# Patient Record
Sex: FEMALE | Race: WHITE | NOT HISPANIC OR LATINO | Employment: FULL TIME | URBAN - METROPOLITAN AREA
[De-identification: names, ages, dates, MRNs, and addresses within clinical notes are randomized per-mention and may not be internally consistent; named-entity substitution may affect disease eponyms.]

---

## 2020-09-16 ENCOUNTER — COMMUNICATION - HEALTHEAST (OUTPATIENT)
Dept: SCHEDULING | Facility: CLINIC | Age: 35
End: 2020-09-16

## 2020-09-16 ENCOUNTER — COMMUNICATION - HEALTHEAST (OUTPATIENT)
Dept: CARDIOLOGY | Facility: CLINIC | Age: 35
End: 2020-09-16

## 2020-09-17 ENCOUNTER — OFFICE VISIT - HEALTHEAST (OUTPATIENT)
Dept: CARDIOLOGY | Facility: CLINIC | Age: 35
End: 2020-09-17

## 2020-09-17 DIAGNOSIS — R00.1 SINUS BRADYCARDIA: ICD-10-CM

## 2020-09-17 ASSESSMENT — MIFFLIN-ST. JEOR: SCORE: 1635.7

## 2020-09-18 ENCOUNTER — HOSPITAL ENCOUNTER (OUTPATIENT)
Dept: CARDIOLOGY | Facility: CLINIC | Age: 35
Discharge: HOME | End: 2020-09-18
Attending: INTERNAL MEDICINE

## 2020-09-21 ENCOUNTER — OFFICE VISIT - HEALTHEAST (OUTPATIENT)
Dept: FAMILY MEDICINE | Facility: CLINIC | Age: 35
End: 2020-09-21

## 2020-09-21 DIAGNOSIS — R00.1 SINUS BRADYCARDIA: ICD-10-CM

## 2020-09-21 DIAGNOSIS — E27.8 ADRENAL INCIDENTALOMA (H): ICD-10-CM

## 2020-09-21 DIAGNOSIS — R10.13 EPIGASTRIC PAIN: ICD-10-CM

## 2020-09-21 DIAGNOSIS — K80.20 CALCULUS OF GALLBLADDER WITHOUT CHOLECYSTITIS WITHOUT OBSTRUCTION: ICD-10-CM

## 2020-09-23 LAB
ALDOST SERPL-MCNC: 4.6 NG/DL
DHEA-S SERPL-MCNC: 270 UG/DL (ref 45–270)

## 2020-09-30 ENCOUNTER — OFFICE VISIT - HEALTHEAST (OUTPATIENT)
Dept: SURGERY | Facility: CLINIC | Age: 35
End: 2020-09-30

## 2020-09-30 ENCOUNTER — COMMUNICATION - HEALTHEAST (OUTPATIENT)
Dept: FAMILY MEDICINE | Facility: CLINIC | Age: 35
End: 2020-09-30

## 2020-09-30 DIAGNOSIS — K80.20 CALCULUS OF GALLBLADDER WITHOUT CHOLECYSTITIS WITHOUT OBSTRUCTION: ICD-10-CM

## 2020-09-30 DIAGNOSIS — K80.50 BILIARY COLIC SYMPTOM: ICD-10-CM

## 2020-10-01 ENCOUNTER — AMBULATORY - HEALTHEAST (OUTPATIENT)
Dept: SURGERY | Facility: AMBULATORY SURGERY CENTER | Age: 35
End: 2020-10-01

## 2020-10-01 DIAGNOSIS — Z11.59 ENCOUNTER FOR SCREENING FOR OTHER VIRAL DISEASES: ICD-10-CM

## 2020-11-02 ENCOUNTER — AMBULATORY - HEALTHEAST (OUTPATIENT)
Dept: LAB | Facility: CLINIC | Age: 35
End: 2020-11-02

## 2020-11-02 DIAGNOSIS — Z11.59 ENCOUNTER FOR SCREENING FOR OTHER VIRAL DISEASES: ICD-10-CM

## 2020-11-04 ENCOUNTER — COMMUNICATION - HEALTHEAST (OUTPATIENT)
Dept: SCHEDULING | Facility: CLINIC | Age: 35
End: 2020-11-04

## 2020-11-04 ENCOUNTER — ANESTHESIA - HEALTHEAST (OUTPATIENT)
Dept: SURGERY | Facility: AMBULATORY SURGERY CENTER | Age: 35
End: 2020-11-04

## 2020-11-04 ASSESSMENT — MIFFLIN-ST. JEOR: SCORE: 1603.94

## 2020-11-05 ENCOUNTER — SURGERY - HEALTHEAST (OUTPATIENT)
Dept: SURGERY | Facility: AMBULATORY SURGERY CENTER | Age: 35
End: 2020-11-05

## 2020-11-05 ASSESSMENT — MIFFLIN-ST. JEOR: SCORE: 1603.94

## 2020-11-09 ENCOUNTER — COMMUNICATION - HEALTHEAST (OUTPATIENT)
Dept: SURGERY | Facility: CLINIC | Age: 35
End: 2020-11-09

## 2020-11-10 ENCOUNTER — COMMUNICATION - HEALTHEAST (OUTPATIENT)
Dept: SURGERY | Facility: CLINIC | Age: 35
End: 2020-11-10

## 2020-11-12 ENCOUNTER — OFFICE VISIT - HEALTHEAST (OUTPATIENT)
Dept: FAMILY MEDICINE | Facility: CLINIC | Age: 35
End: 2020-11-12

## 2020-11-12 DIAGNOSIS — T50.905A ADVERSE EFFECT OF DRUG, INITIAL ENCOUNTER: ICD-10-CM

## 2021-04-08 ENCOUNTER — RECORDS - HEALTHEAST (OUTPATIENT)
Dept: ADMINISTRATIVE | Facility: OTHER | Age: 36
End: 2021-04-08

## 2021-04-29 ENCOUNTER — AMBULATORY - HEALTHEAST (OUTPATIENT)
Dept: NURSING | Facility: CLINIC | Age: 36
End: 2021-04-29

## 2021-05-20 ENCOUNTER — AMBULATORY - HEALTHEAST (OUTPATIENT)
Dept: NURSING | Facility: CLINIC | Age: 36
End: 2021-05-20

## 2021-06-05 VITALS
DIASTOLIC BLOOD PRESSURE: 70 MMHG | WEIGHT: 208.3 LBS | SYSTOLIC BLOOD PRESSURE: 100 MMHG | BODY MASS INDEX: 33.62 KG/M2 | HEART RATE: 53 BPM | OXYGEN SATURATION: 99 %

## 2021-06-05 VITALS — WEIGHT: 200 LBS | BODY MASS INDEX: 32.14 KG/M2 | HEIGHT: 66 IN

## 2021-06-05 VITALS
HEART RATE: 60 BPM | DIASTOLIC BLOOD PRESSURE: 62 MMHG | BODY MASS INDEX: 33.73 KG/M2 | SYSTOLIC BLOOD PRESSURE: 110 MMHG | WEIGHT: 209 LBS

## 2021-06-05 VITALS
SYSTOLIC BLOOD PRESSURE: 130 MMHG | BODY MASS INDEX: 33.27 KG/M2 | DIASTOLIC BLOOD PRESSURE: 74 MMHG | HEART RATE: 40 BPM | RESPIRATION RATE: 16 BRPM | WEIGHT: 207 LBS | HEIGHT: 66 IN

## 2021-06-05 VITALS — SYSTOLIC BLOOD PRESSURE: 120 MMHG | WEIGHT: 204.9 LBS | DIASTOLIC BLOOD PRESSURE: 68 MMHG | BODY MASS INDEX: 33.07 KG/M2

## 2021-06-11 NOTE — PATIENT INSTRUCTIONS - HE
Marii,    It was a pleasure to see you today at [facility].    My nurse or I will call you with the Holter monitor results. I am optimistic that the findings will be normal.    Please call us if you have any questions or concerns about your heart.      Jj Ludwig M.D.  [facility]

## 2021-06-11 NOTE — PROGRESS NOTES
Assessment/Plan:     1. Epigastric pain  2. Calculus of gallbladder without cholecystitis without obstruction  Encourage patient to keep her appointment with surgery as scheduled.  Advised trial of omeprazole once daily for the next 14 days to see if this makes a difference in her discomfort as any of her symptoms are suggestive of gastritis or similar.  Continue to avoid fatty foods.  Monitor for worsening or onset of severe symptoms that fail to improve.    3. Adrenal incidentaloma (H)  We reviewed that this is most likely an incidental finding, however will screen for underlying condition that could potentially become problematic.  We will plan to obtain CT scan imaging in 1 year to confirm stability.  - Dehydroepiandrosterone Sulfate, Serum (DHEAS)  - Aldosterone, Serum    4. Sinus bradycardia  Appreciate evaluation by cardiology, will await Holter monitor results.      Patient Instructions   Begin omeprazole (Prilosec) once daily for the next 2 weeks.  You may take Tums, Rolaids, or Maalox as needed.  This can help with stomach irritation related to acid which can sometimes mimic gallstones.       Return in about 4 weeks (around 10/19/2020) for Annual physical with Pap.      Subjective:      Marii Mcleod is a 35 y.o. female presented to clinic today for follow-up of emergency room visit that occurred on September 16.  She had had epigastric and right upper quadrant pain that was persisting, worsening throughout the day, associated with some nausea.  In the emergency room her labs looked good.  CT imaging showed gallstones but no signs of cholecystitis.  It took a few days but the pain resolved after that visit with ibuprofen, Tylenol, rest, and a light diet.  2 days ago she awoke with some mild pain, that is now resolved entirely.  Still little bit tender to her abdomen she feels.  She has been eating lighter, avoiding fried and fatty foods.  She is scheduled to see Dr. Crawford of surgery on September  30.  Bowel movements are normal, denies hematochezia or melena.  Denies nausea.  No lightheadedness or dizziness.    During emergency room visit it was noted that her heart rate was frequently in the 30s.  She saw Dr. Ludwig of cardiology on September 17, he reviewed lab results, ordered Holter monitor which remains pending.  She denies any lightheadedness, dizziness, shortness of breath, fatigue, or limitation in activity level.    CT of the abdomen and pelvis obtained in emergency room incidentally notes 1.8 cm right adrenal nodule.  Patient is unaware of previous imaging.  She denies any difficulties with panic attacks, lightheadedness, episodes of shaking, sweating inappropriately, or other symptoms of pheochromocytoma.  Denies is any change in hair growth, significant change in weight.    We reviewed that she is due for a Pap smear, tetanus booster, and influenza vaccine.    Current Outpatient Medications   Medication Sig Dispense Refill     cholecalciferol, vitamin D3, 50 mcg (2,000 unit) Tab Take 1 tablet by mouth daily.       docosahexaenoic acid/epa (FISH OIL ORAL) Take 1,200 mg by mouth daily.       multivit-min/ferrous fumarate (MULTI VITAMIN ORAL) Take 1 tablet by mouth daily.       ondansetron (ZOFRAN-ODT) 4 MG disintegrating tablet Take 1 tablet (4 mg total) by mouth every 6 (six) hours as needed for nausea. 15 tablet 0     No current facility-administered medications for this visit.        Past Medical History, Family History, and Social History reviewed.  No past medical history on file.  Past Surgical History:   Procedure Laterality Date     TONSILLECTOMY AND ADENOIDECTOMY  2001     Patient has no known allergies.  Family History   Problem Relation Age of Onset     No Medical Problems Sister      No Medical Problems Brother      Hypothyroidism Sister      No Medical Problems Sister      Morbid Obesity Mother         had gastric bypass at 57     Diabetes type II Father      Hypothyroidism Father       Hypertension Father      Pacemaker Neg Hx      Social History     Socioeconomic History     Marital status: Single     Spouse name: Not on file     Number of children: 0     Years of education: Not on file     Highest education level: Not on file   Occupational History     Occupation: electronic banking     Comment: [email or web address]   Social Needs     Financial resource strain: Not on file     Food insecurity     Worry: Not on file     Inability: Not on file     Transportation needs     Medical: Not on file     Non-medical: Not on file   Tobacco Use     Smoking status: Never Smoker     Smokeless tobacco: Never Used   Substance and Sexual Activity     Alcohol use: Never     Drug use: Never     Sexual activity: Not on file   Lifestyle     Physical activity     Days per week: 3 days     Minutes per session: 60 min     Stress: Not on file   Relationships     Social connections     Talks on phone: Not on file     Gets together: Not on file     Attends Jainism service: Not on file     Active member of club or organization: Not on file     Attends meetings of clubs or organizations: Not on file     Relationship status: Not on file     Intimate partner violence     Fear of current or ex partner: Not on file     Emotionally abused: Not on file     Physically abused: Not on file     Forced sexual activity: Not on file   Other Topics Concern     Not on file   Social History Narrative     Not on file         Review of systems is as stated in HPI, and the remainder of the 10 system review is otherwise negative.    Objective:     Vitals:    09/21/20 1541   BP: 110/62   Patient Site: Right Arm   Patient Position: Sitting   Cuff Size: Adult Regular   Pulse: 60   Weight: 209 lb (94.8 kg)    Body mass index is 33.73 kg/m .    Alert female.  Mucous membranes moist.  Heart is with bradycardic but regular rhythm, heart rate approximately 40 to 45 bpm on my exam.  No murmurs.  Lungs clear and well aerated.  Abdomen is soft.  Very mild  tenderness palpation right upper quadrant with no palpable masses or organomegaly.  No rebound or guarding.  Extremities without edema.      This note has been dictated using voice recognition software. Any grammatical or context distortions are unintentional and inherent to the the software.

## 2021-06-11 NOTE — TELEPHONE ENCOUNTER
Wellness Screening Tool  Symptom Screening:  Do you have one of the following NEW symptoms:    Fever (subjective or >100.0)?  No    A new cough?  No    Shortness of breath?  No     Chills? No     New loss of taste or smell? No     Generalized body aches? No     New persistent headache? No     New sore throat? No     Nausea, vomiting, or diarrhea?  nausea started yesterday, comes and goes    Within the past 2 weeks, have you been exposed to someone with a known positive illness below:    COVID-19 (known or suspected)?  No    Chicken pox?  No    Mealses?  No    Pertussis?  No    Patient notified of visitor policy- They may have one person accompany them to their appointment, but they will need to wear a mask and will be screened upon arrival for symptoms: Yes  Pt informed to wear a mask: Yes  Pt notified if they develop any symptoms listed above, prior to their appointment, they are to call the clinic directly at [phone] for further instructions.  Yes  Patient's appointment status: Patient will be seen in clinic as scheduled on 9/17/20

## 2021-06-11 NOTE — PROGRESS NOTES
I was consulted by Jessica Bhagat MD to evaluate this patients gall bladder.    HPI: Marii Mcleod is a 35 y.o. female who has been experiencing some problems with epigastric abdominal pain.. she has been noting this for about 2 weeks. It has been occurring about a few times per week. It is not associated with nausea or vomiting.  She was worked up in the ER with a CT scan which showed stones in the gall bladder.    Allergies:Patient has no known allergies.    History reviewed. No pertinent past medical history.    Past Surgical History:   Procedure Laterality Date     TONSILLECTOMY AND ADENOIDECTOMY  2001       CURRENT MEDS:    Current Outpatient Medications:      cholecalciferol, vitamin D3, 50 mcg (2,000 unit) Tab, Take 1 tablet by mouth daily., Disp: , Rfl:      docosahexaenoic acid/epa (FISH OIL ORAL), Take 1,200 mg by mouth daily., Disp: , Rfl:      multivit-min/ferrous fumarate (MULTI VITAMIN ORAL), Take 1 tablet by mouth daily., Disp: , Rfl:      omeprazole (PRILOSEC) 20 MG capsule, Take 20 mg by mouth daily before breakfast., Disp: , Rfl:      ondansetron (ZOFRAN-ODT) 4 MG disintegrating tablet, Take 1 tablet (4 mg total) by mouth every 6 (six) hours as needed for nausea., Disp: 15 tablet, Rfl: 0    Family History   Problem Relation Age of Onset     No Medical Problems Sister      No Medical Problems Brother      Hypothyroidism Sister      No Medical Problems Sister      Morbid Obesity Mother         had gastric bypass at 57     Diabetes type II Father      Hypothyroidism Father      Hypertension Father      Pacemaker Neg Hx         reports that she has never smoked. She has never used smokeless tobacco. She reports that she does not drink alcohol or use drugs.    Review of Systems:  10 system were reviewed and all are within normal limits except for those listed in the HPI.      EXAM:  /68 (Patient Site: Right Arm, Patient Position: Sitting, Cuff Size: Adult Regular)   Wt 204 lb 14.4 oz  (92.9 kg)   BMI 33.07 kg/m    GENERAL: Well developed female   EYES: Anicteric Sclera,  EOMI  CARDIAC: RRR w/out murmur  CHEST/LUNG: Clear to ascultation, No wheezes  ABDOMEN: Soft with, +Bowel Sounds  NEURO:No focal deficits, ambulatory  LYMPH: No Axillary or inguinal Adenopathy  EXTREMITIES: Ambulatory, No lower extremity deformities      LABS:  Lab Results   Component Value Date    WBC 7.6 09/16/2020    HGB 14.4 09/16/2020    HCT 43.2 09/16/2020    MCV 88 09/16/2020     09/16/2020     INR/Prothrombin Time      Lab Results   Component Value Date    ALT 32 09/16/2020    AST 26 09/16/2020    ALKPHOS 48 09/16/2020    BILITOT 0.9 09/16/2020       IMAGES:   EXAM: CT ABDOMEN AND PELVIS WITH CONTRAST  LOCATION: [facility]  DATE/TIME: 09/16/2020, 4:20 AM     INDICATION: Upper abdominal pain.  COMPARISON: None.     TECHNIQUE: CT scan of the abdomen and pelvis was performed following injection of IV contrast. Multiplanar reformats were obtained. Dose reduction techniques were used.  CONTRAST: 100 mL Iohexol.     FINDINGS:     LOWER CHEST: Unremarkable.     HEPATOBILIARY: The gallbladder is moderately distended and contains several gallstones. No visualized gallbladder wall thickening or pericholecystic inflammatory changes.     SPLEEN: Unremarkable.     PANCREAS: Unremarkable.     ADRENAL GLANDS: 1.8 x 1.1 cm right adrenal nodule (series 3 image 41).     KIDNEYS/BLADDER: Unremarkable.     BOWEL: Normal appendix.     LYMPH NODES: Unremarkable.     OTHER: A trace amount of free fluid in the pelvis, within normal physiologic limits.     IMPRESSION:   1.  The gallbladder is moderately distended and contains several gallstones. No convincing CT evidence of acute cholecystitis. If there is a clinical concern for acute cholecystitis, a right upper quadrant ultrasound or HIDA scan could be considered for   further evaluation.  2.  No other cause of acute pain identified in the abdomen or pelvis. Normal  appendix.  3.  1.8 cm indeterminate right adrenal nodule. In the absence of known malignancy, this likely represents an adenoma.       Assessment/Plan: Pt with signs and symptoms consistent with chronic cholecystitis. I have recommended a cholecystectomy. I discussed with her the plan to do this laparoscopically understanding the possibility of needing to convert to an open operation. I went over some of the risks of surgery including but not limited to bleeding, infection and bile duct injury. I also discussed the outpatient nature of the surgery and the expected recovery time.      The pt also has a 1.8 cm R adrenal mass.  This lesion does not appear hormonally active on initial studies. This should be monitored periodically.      Manan Crawford MD  [phone]  [facility] Department of Surgery

## 2021-06-11 NOTE — TELEPHONE ENCOUNTER
Has follow up appt with cardiology tomorrow and Soon[facility]  PCP visit on Monday 9/21/20 and then surgerical appt .   FNA Follow up question from ED visit  : Pt called.    Presenting problem :  Currently : no change in symptoms = Seen in ED @ [facility] 9/16/20 and left at 645 am , unable to get any relief from Tylenol and RUQ constant  abdominal pain is still up to  9/10 .  Abdominal pain - Dx with  Bilary Gallstones.   Disposition and recommendations : Reviewed discharge summary instruction but didn't address this pain issue . FNA  Conferenced and spoke to [facility] ED  Dr Milligan and she agreed returning to ED for pain control  Was appropriate and Pt agrees to go .   Caller verbalizes understanding and denies further questions and will call back if further symptoms to triage or questions  . Dorcas Ferreira RN  - [facility] Nurse Advisor   COVID 19 Nurse Triage Plan/Patient Instructions    Please be aware that novel coronavirus (COVID-19) may be circulating in the community. If you develop symptoms such as fever, cough, or SOB or if you have concerns about the presence of another infection including coronavirus (COVID-19), please contact your health care provider or visit [email or web address].     Disposition/Instructions Pt agrees to go to ED.     ED Visit recommended. Follow protocol based instructions.      Bring Your Own Device:  Please also bring your smart device(s) (smart phones, tablets, laptops) and their charging cables for your personal use and to communicate with your care team during your visit.      Thank you for taking steps to prevent the spread of this virus.  o Limit your contact with others.  o Wear a simple mask to cover your cough.  o Wash your hands well and often.    Reason for Disposition    Nursing judgment or information in reference    Additional Information    Negative: Nursing judgment, per information in Reference    Negative: Information only call about a Well Adult (no illness or  injury)    Protocols used: NO GUIDELINE [id number] Main nurse advisors .

## 2021-06-11 NOTE — PATIENT INSTRUCTIONS - HE
Begin omeprazole (Prilosec) once daily for the next 2 weeks.  You may take Tums, Rolaids, or Maalox as needed.  This can help with stomach irritation related to acid which can sometimes mimic gallstones.

## 2021-06-12 NOTE — ANESTHESIA CARE TRANSFER NOTE
Last vitals:   Vitals:    11/05/20 1250   BP: (P) 118/60   Pulse: (P) 63   Resp: (P) 16   Temp: (P) 36.4  C (97.5  F)   SpO2: (P) 100%     Patient's level of consciousness is drowsy  Spontaneous respirations: yes  Maintains airway independently: yes  Dentition unchanged: yes  Oropharynx: oropharynx clear of all foreign objects    QCDR Measures:  ASA# 20 - Surgical Safety Checklist: WHO surgical safety checklist completed prior to induction    PQRS# 430 - Adult PONV Prevention: 4558F - Pt received => 2 anti-emetic agents (different classes) preop & intraop  ASA# 8 - Peds PONV Prevention: NA - Not pediatric patient, not GA or 2 or more risk factors NOT present  PQRS# 424 - Shahrzad-op Temp Management: 4559F - At least one body temp DOCUMENTED => 35.5C or 95.9F within required timeframe  PQRS# 426 - PACU Transfer Protocol: - Transfer of care checklist used  ASA# 14 - Acute Post-op Pain: ASA14B - Patient did NOT experience pain >= 7 out of 10

## 2021-06-12 NOTE — TELEPHONE ENCOUNTER
Patient's surgeon or provider: Dr. Crawford     Caller: Marii     Phone Number: [phone]  OK to leave message: Yes    Reason for Call: Pt is calling to request a return to work note [place]

## 2021-06-12 NOTE — ANESTHESIA POSTPROCEDURE EVALUATION
Patient: Marii Mcleod  Procedure(s):  CHOLECYSTECTOMY, LAPAROSCOPIC  Anesthesia type: general    Patient location: Phase II Recovery  Last vitals:   Vitals Value Taken Time   /74 11/05/20 1415   Temp 36.4  C (97.5  F) 11/05/20 1250   Pulse 45 11/05/20 1428   Resp 16 11/05/20 1345   SpO2 99 % 11/05/20 1428   Vitals shown include unvalidated device data.  Post vital signs: stable  Level of consciousness: awake and responds to simple questions  Post-anesthesia pain: pain controlled  Post-anesthesia nausea and vomiting: no  Pulmonary: unassisted, return to baseline  Cardiovascular: stable and blood pressure at baseline  Hydration: adequate  Anesthetic events: no    QCDR Measures:  ASA# 11 - Shahrzad-op Cardiac Arrest: ASA11B - Patient did NOT experience unanticipated cardiac arrest  ASA# 12 - Shahrzad-op Mortality Rate: ASA12B - Patient did NOT die  ASA# 13 - PACU Re-Intubation Rate: ASA13B - Patient did NOT require a new airway mgmt  ASA# 10 - Composite Anes Safety: ASA10A - No serious adverse event    Additional Notes: baseline bradycardia continues. BP stable.

## 2021-06-12 NOTE — ANESTHESIA PREPROCEDURE EVALUATION
Anesthesia Evaluation      Patient summary reviewed   No history of anesthetic complications     Airway   Mallampati: II  Neck ROM: full   Pulmonary - negative ROS and normal exam                          Cardiovascular - negative ROS and normal exam  ECG reviewed     ROS comment: Marked sinus bradycardia - recent holter monitor essentially normal     Neuro/Psych - negative ROS     Endo/Other - negative ROS   (+) obesity (bmi 32),      Comments: Adrenal incidentaloma - labs ok    GI/Hepatic/Renal - negative ROS           Dental - normal exam   (+) caps                       Anesthesia Plan  Planned anesthetic: general endotracheal and total IV anesthesia  Versed/fentanyl/propofol/mervin/glyco  Propofol TIVA  Ketamine PRN  Decadron/zofran/scop      ASA 2   Induction: intravenous   Anesthetic plan and risks discussed with: patient  Anesthesia plan special considerations: antiemetics,   Post-op plan: routine recovery      covid pcr negative 11/2/2020    Results for orders placed or performed during the hospital encounter of 11/05/20   POCT Pregnancy (Beta-hCG, Qual), Urine (Point of Care) on DOS   Result Value Ref Range    POC Preg, Urine Negative Negative    POCT Kit Lot Number [id number]     POCT Kit Expiration Date 202,202     Pos Control Valid Control Valid Control    Neg Control Valid Control Valid Control    Dipstick Lot Number      Dipstick Expiration Date      POC Specific Gravity, Urine         Conclusion    HOLTER MONITOR REPORT:     Ordering provider:  Cesar Ludwig MD   Indication: Bradycardia  Monitoring time: 24 Hours.      Interpretation:     1. Baseline rhythm: Sinus bradycardia and sinus rhythm, average heart rate 54 bpm, minimum heart rate 37 bpm 11:56 PM, sinus bradycardia, maximal heart rate 164 bpm at 8:53 PM, tachycardia.       2. Longest pause: No significant pauses.       3. QRS morphology: Narrow.       4. Supraventricular ectopy: Rare PACs total of 29 beats.  No atrial runs, no PSVT or atrial  flutter.     5. Ventricular ectopy:      None detected     6. Atrial fibrillation/flutter:     None detected     7. AV conduction: Normal AV josué conduction.       8. Symptomatic transmissions: Activities diary without symptoms recorded.     Conclusion:       1. Essentially normal 24-hour Holter monitor demonstrating resting sinus rhythm and sinus bradycardia and appropriate acceleration of sinus rates with normal AV josué conduction during exercise as indicated by the patient.   2. Rare PACs without atrial fibrillation or flutter.

## 2021-06-13 NOTE — PROGRESS NOTES
Assessment:     1. Adverse effect of drug, initial encounter  methylPREDNISolone (MEDROL DOSEPACK) 4 mg tablet       Plan:     1. Adverse effect of drug, initial encounter  Patient has an urticarial hive-like rash on her anterior abdominal wall rolling over into her flanks around almost touching in the midline in the back; this may well be due to the preoperative painting with possible iodine-based dye and/or the plastic over the abdomen; also a possibility of hydrocodone  - methylPREDNISolone (MEDROL DOSEPACK) 4 mg tablet; Follow package directions  Dispense: 21 tablet; Refill: 0      Subjective:   Patient had a laparoscopic cholecystectomy approximately 6 days ago and had uneventful recovery; she was discharged on hydrocodone tablets which she took Thursday Friday Saturday Sunday she broke out in a rash on her anterior abdominal wall and it went around to her back it is urticarial in nature it appears to be going over her thighs.  This appears to be a drug reaction either related to the surgical preoperative painting and prepping and/or occlusive dressing over the abdomen during gallbladder surgery.  Possibility of hydrocodone also exists.  Patient will be treated with Medrol Dosepak.  Adrenal incidentaloma noted and should be followed in 1 year I had patient repeat instructions to me.  Very pleasant young lady who is doing really quite well postoperatively.    Review of Systems: A complete 14 point review of systems was obtained and is negative or as stated in the history of present illness.    No past medical history on file.  Family History   Problem Relation Age of Onset     No Medical Problems Sister      No Medical Problems Brother      Hypothyroidism Sister      No Medical Problems Sister      Morbid Obesity Mother         had gastric bypass at 57     Diabetes type II Father      Hypothyroidism Father      Hypertension Father      Pacemaker Neg Hx      Past Surgical History:   Procedure Laterality Date      KS LAP,CHOLECYSTECTOMY N/A 11/5/2020    Procedure: CHOLECYSTECTOMY, LAPAROSCOPIC;  Surgeon: Manan Crawford MD;  Location: [facility];  Service: General     TONSILLECTOMY AND ADENOIDECTOMY  2001     Social History     Tobacco Use     Smoking status: Never Smoker     Smokeless tobacco: Never Used   Substance Use Topics     Alcohol use: Never     Drug use: Never         Objective:   /70 (Patient Site: Right Arm, Patient Position: Sitting, Cuff Size: Adult Large)   Pulse (!) 53   Wt 208 lb 4.8 oz (94.5 kg)   LMP 10/25/2020 (Approximate)   SpO2 99%   BMI 33.62 kg/m      General Appearance:  Normal  Head:  Normal  Ears: Normal  Eyes:  Normal  Nose:  Normal  Throat:  Normal  Neck:  Normal  Back:  Normal  Chest/Breast:Normal  Lungs:  Normal  Heart:  Normal  Abdomen:  Normal  Musculoskeletal:  Normal  Lymphatic:  Normal  Skin/Hair/Nails:  Normal  Neurologic:  Normal  Extremities:  Normal  Genitourinary: Normal  Pulses:  Normal           This note has been dictated using voice recognition software. Any grammatical or context distortions are unintentional and inherent to the the software.

## 2021-06-16 PROBLEM — K80.50 BILIARY COLIC SYMPTOM: Status: ACTIVE | Noted: 2020-10-01

## 2021-06-16 PROBLEM — E27.8 ADRENAL INCIDENTALOMA (H): Status: ACTIVE | Noted: 2020-09-21

## 2021-06-16 PROBLEM — K80.20 CALCULUS OF GALLBLADDER WITHOUT CHOLECYSTITIS WITHOUT OBSTRUCTION: Chronic | Status: ACTIVE | Noted: 2020-09-16

## 2021-06-16 PROBLEM — R00.1 SINUS BRADYCARDIA: Status: ACTIVE | Noted: 2020-09-21

## 2021-06-20 NOTE — LETTER
Letter by Jessica Bhagat MD at      Author: Jessica Bhagat MD Service: -- Author Type: --    Filed:  Encounter Date: 9/30/2020 Status: (Other)         Marii Mcleod  [address] [place] [address]             September 30, 2020        Dear Ms. Mcleod,    Below are the results from your recent visit:    Resulted Orders   Dehydroepiandrosterone Sulfate, Serum (DHEAS)   Result Value Ref Range    DHEAS 270 45 - 270 ug/dL      Comment:      REFERENCE INTERVAL: DHEAS    Access complete set of age- and/or gender-specific reference   intervals for this test in the [employer] [facility] Directory   ([email or web address]).  Performed By: [facility]  [address] [place] [id number]  : Sandra Samano MD   Aldosterone, Serum   Result Value Ref Range    Aldosterone 4.6 ng/dL      Comment:      INTERPRETIVE INFORMATION: Aldosterone, Serum    Reference intervals for age 15 and older:   Upright .........  4.0 - 31.0 ng/dL   Supine ..........  Less than or equal to 16.0 ng/dL   Unspecified .....  Less than or equal to 31.0 ng/dL    Normal serum levels of aldosterone are dependent on the sodium   intake and whether the patient is upright or supine. High sodium   intake will tend to suppress serum aldosterone, whereas low sodium   intake will elevate serum aldosterone. The reference intervals for   serum aldosterone are based on normal sodium intake.      Access complete set of age- and/or gender-specific reference   intervals for this test in the [facility] Directory   ([email or web address]).  Performed By: [facility]  [address] [place] [id number]  : Sandra Samano MD       Your test results are in the normal range.  I think it would be a good idea to repeat these in about a year, especially since the DHEAS hormone is right at the upper limit of normal.  No signs that the spot seen on your adrenal gland is causing any problems.      Please call  with questions or contact us using [employer].    Sincerely,        Electronically signed by Jessica Bhagat MD

## 2021-06-21 NOTE — LETTER
Letter by Diann Benavidez RN at      Author: Diann Benavidez RN Service: -- Author Type: --    Filed:  Encounter Date: 11/10/2020 Status: (Other)         November 10, 2020     Patient: Marii Mcleod   YOB: 1985   Date of Visit: 11/10/2020       To Whom It May Concern:    It is my medical opinion that Marii Mcleod may return to work on 11/13/2020 with no restrictions.    If you have any questions or concerns, please don't hesitate to call.    Sincerely,        Electronically signed by Manan Crawford MD

## 2021-06-27 ENCOUNTER — HEALTH MAINTENANCE LETTER (OUTPATIENT)
Age: 36
End: 2021-06-27

## 2021-06-29 NOTE — PROGRESS NOTES
Progress Notes by Cesar Ludwig MD at 9/17/2020  8:20 AM     Author: Cesar Ludwig MD Service: -- Author Type: Physician    Filed: 9/17/2020  9:03 AM Encounter Date: 9/17/2020 Status: Signed    : Cesar Ludwig MD (Physician)            [facility] Consultation Note    Thank you, Dr. Omar Norman, for advising Marii Mcleod to meet with me in consultation today at the [facility] to evaluate marked sinus bradycardia.     Assessment:    1. Sinus bradycardia  Holter Monitor       Plan:    1. We discussed means of demonstrating that she has a normal heart rate response to activities of daily living and she agreed to undergo 24-hour Holter monitoring to establish this.  I counseled Marii that I am optimistic that the findings will be unremarkable.    An After Visit Summary was printed and given to the patient.    Current History:    I met with Marii in consultation this morning as requested by the emergency room team.  Her sister, Era, accompanied her to the visit.    Marii went to the emergency room because of right upper quadrant discomfort was found to have gallstones by CT scanning.  She states her discomfort is improving.    While in the emergency room, an EKG demonstrated sinus bradycardia with a heart rate of 35 bpm.    Marii has virtually no cardiac symptoms.  She states she goes to the gym 3-4 times a week to participate in 1 hour exercise classes including cardio workouts.  She does not experience chest discomfort, unusual shortness of breath, orthopnea, palpitations, lightheadedness, syncope or edema.    Family history is negative for premature myocardial infarction, stroke, or the need for cardiac pacing.    Patient Active Problem List   Diagnosis   ? Class 1 obesity in adult   ? Calculus of gallbladder without cholecystitis without obstruction       Past Medical History:  History reviewed. No pertinent  "past medical history.   Her past history is negative for diabetes mellitus, hypertension or dyslipidemia.    Past Surgical History:  Past Surgical History:   Procedure Laterality Date   ? TONSILLECTOMY AND ADENOIDECTOMY  2001       Family History:  Family History   Problem Relation Age of Onset   ? No Medical Problems Sister    ? No Medical Problems Brother    ? Hypothyroidism Sister    ? No Medical Problems Sister    ? Morbid Obesity Mother         had gastric bypass at 57   ? Diabetes type II Father    ? Hypothyroidism Father    ? Hypertension Father    ? Pacemaker Neg Hx        Social History:   reports that she has never smoked. She has never used smokeless tobacco. She reports that she does not drink alcohol or use drugs.    Medications:  Outpatient Encounter Medications as of 9/17/2020   Medication Sig Dispense Refill   ? cholecalciferol, vitamin D3, 50 mcg (2,000 unit) Tab Take 1 tablet by mouth daily.     ? docosahexaenoic acid/epa (FISH OIL ORAL) Take 1,200 mg by mouth daily.     ? multivit-min/ferrous fumarate (MULTI VITAMIN ORAL) Take 1 tablet by mouth daily.     ? ondansetron (ZOFRAN-ODT) 4 MG disintegrating tablet Take 1 tablet (4 mg total) by mouth every 6 (six) hours as needed for nausea. 15 tablet 0     No facility-administered encounter medications on file as of 9/17/2020.        Allergies:  Patient has no known allergies.    Review of Systems:     General: WNL  Eyes: WNL  Ears/Nose/Throat: WNL  Lungs: WNL  Heart: WNL  Stomach: Nausea  Bladder: WNL  Muscle/Joints: WNL  Skin: WNL  Nervous System: WNL  Mental Health: Anxiety     Blood: WNL    Objective:    Wt Readings from Last 5 Encounters:   09/17/20 207 lb (93.9 kg)   09/16/20 210 lb (95.3 kg)      5' 6\" (1.676 m)  Body mass index is 33.41 kg/m .  /74 (Patient Site: Right Arm, Patient Position: Sitting, Cuff Size: Adult Regular)   Pulse (!) 40   Resp 16   Ht 5' 6\" (1.676 m)   Wt 207 lb (93.9 kg)   BMI 33.41 kg/m       Physical " Exam:    General Appearance: Alert and not in distress   HEENT: No scleral icterus; the tongue could not be examined as she is wearing a mask due to Covid restrictions, the mucous membranes are pink and moist   Neck: No cervical bruits, adenopathy, or thyromegaly; jugular venous pressure is difficult to evaluate due to obesity   Chest: The spine is straight and the chest is symmetric   Lungs: Respirations are unlabored; the lungs are clear to auscultation   Cardiovasular: Auscultation reveals normal first and second heart sounds and no murmurs, rubs, or gallops; the carotid, radial, femoral, and posterior tibial pulses are intact   Abdomen: No organomegaly, masses, bruits, or tenderness; bowel sounds are present   Extremities: No cyanosis, clubbing or edema   Skin: No xanthelasma   Neurologic: Mood and affect are appropriate       Cardiac testing:    EKG: Marked sinus bradycardia with a heart rate of 35 bpm and incomplete right bundle branch block per my personal review.    Holter monitor: ordered    Imaging:    Ct Abdomen Pelvis Without Oral With Iv Contrast    Result Date: 9/16/2020  EXAM: CT ABDOMEN AND PELVIS WITH CONTRAST LOCATION: [facility] DATE/TIME: 09/16/2020, 4:20 AM INDICATION: Upper abdominal pain. COMPARISON: None. TECHNIQUE: CT scan of the abdomen and pelvis was performed following injection of IV contrast. Multiplanar reformats were obtained. Dose reduction techniques were used. CONTRAST: 100 mL Iohexol. FINDINGS: LOWER CHEST: Unremarkable. HEPATOBILIARY: The gallbladder is moderately distended and contains several gallstones. No visualized gallbladder wall thickening or pericholecystic inflammatory changes. SPLEEN: Unremarkable. PANCREAS: Unremarkable. ADRENAL GLANDS: 1.8 x 1.1 cm right adrenal nodule (series 3 image 41). KIDNEYS/BLADDER: Unremarkable. BOWEL: Normal appendix. LYMPH NODES: Unremarkable. OTHER: A trace amount of free fluid in the pelvis, within normal physiologic limits.      1.  The gallbladder is moderately distended and contains several gallstones. No convincing CT evidence of acute cholecystitis. If there is a clinical concern for acute cholecystitis, a right upper quadrant ultrasound or HIDA scan could be considered for further evaluation. 2.  No other cause of acute pain identified in the abdomen or pelvis. Normal appendix. 3.  1.8 cm indeterminate right adrenal nodule. In the absence of known malignancy, this likely represents an adenoma.       Lab Review:    Lab Results   Component Value Date     09/16/2020    K 4.4 09/16/2020     09/16/2020    CO2 25 09/16/2020    BUN 15 09/16/2020    CREATININE 0.78 09/16/2020    CALCIUM 9.4 09/16/2020     Lab Results   Component Value Date    WBC 7.6 09/16/2020    HGB 14.4 09/16/2020    HCT 43.2 09/16/2020    MCV 88 09/16/2020     09/16/2020     Lab Results   Component Value Date    CHOL 160 10/29/2014    TRIG 40 10/29/2014    HDL 54 10/29/2014     LDL Calculated (mg/dL)   Date Value   10/29/2014 98     No results found for: BNP        Much or all of the text in this note was generated through the use of the Dragon Dictate voice-to-text software. Errors in spelling or words which seem out of context are unintentional. Sound alike errors, in particular, may have escaped editing.

## 2021-10-17 ENCOUNTER — HEALTH MAINTENANCE LETTER (OUTPATIENT)
Age: 36
End: 2021-10-17

## 2021-11-03 ENCOUNTER — TELEPHONE (OUTPATIENT)
Dept: NURSING | Facility: CLINIC | Age: 36
End: 2021-11-03

## 2021-11-03 ENCOUNTER — E-VISIT (OUTPATIENT)
Dept: URGENT CARE | Facility: URGENT CARE | Age: 36
End: 2021-11-03
Payer: COMMERCIAL

## 2021-11-03 DIAGNOSIS — Z20.822 CLOSE EXPOSURE TO 2019 NOVEL CORONAVIRUS: Primary | ICD-10-CM

## 2021-11-03 PROCEDURE — 99421 OL DIG E/M SVC 5-10 MIN: CPT | Performed by: PREVENTIVE MEDICINE

## 2021-11-03 NOTE — PATIENT INSTRUCTIONS
"  Dear Marii Mcleod,    Based on your exposure to COVID-19 (coronavirus), we would like to test you for this virus. I have placed an order for this test.The best time for testing is 5-7 days after the exposure.    How to schedule:  Go to your [facility] home page and scroll down to the section that says  You have an appointment that needs to be scheduled  and click the large green button that says  Schedule Now  and follow the steps to find the next available opening.     If you are unable to complete these [facility] scheduling steps, please call [phone] to schedule your testing.     Return to work/school/ guidance:   For people with high risk exposures outside the home    Please let your workplace manager and staffing office know when your quarantine ends.     We can not give you an exact date as it depends on the information below. You can calculate this on your own or work with your manager/staffing office to calculate this. (For example if you were exposed on 10/4, you would have to quarantine for 14 full days. That would be through 10/18. You could return on 10/19.)    Quarantine Guidelines:  Patients (\"contacts\") who have been in close prolonged contact of an infected person(s) (within six feet for at least 15 minutes within a 24 hour period), and remain asymptomatic should enter quarantine based on the following options:    14-day quarantine period (this remains the CDC recommendation for the greatest protection against spread of COVID-19) OR    Minimum 7-day quarantine with negative RT-PCR test collected on day 5 or later OR    10-day quarantine with no test  Quarantine Guideline exceptions are as follows:    People who have been fully vaccinated do not need to quarantine if the exposure was at least 2 weeks after the last vaccination. This includes vaccinated health care workers.    Not fully vaccinated and unvaccinated Individuals who work in health care, congregate care, or congregate living " should be off work for 14 days from their last date of exposure. Community activities for this group can be resumed based on options above. Fully vaccinated individuals in this group do not need to quarantine from work after exposure.    Not fully vaccinated and unvaccinated people whose high-risk exposure was a household member should always quarantine for 14 days from their last date of exposure. Fully vaccinated people in this category do not need to quarantine.    Not fully vaccinated or unvaccinated residents of congregate care and congregate living settings should always quarantine for 14 days from their last date of exposure. Fully vaccinated residents do not need to quarantine.  Note: If you have ongoing exposure to the covid positive person, this quarantine period may be more than 14 days. (For example, if you are continued to be exposed to your child who tested positive and cannot isolate from them, then the quarantine of 7-14 days can't start until your child is no longer contagious. This is typically 10 days from onset of the child's symptoms. So the total duration may be 17-24 days in this case.)    You should continue symptom monitoring until day 14 post-exposure. If you develop signs or symptoms of COVID-19, isolate and get tested (even if you have been tested already).    How to quarantine:   Stay home and away from others. Don't go to school or anywhere else. Generally quarantine means staying home from work but there are some exceptions to this. Please contact your workplace.  No hugging, kissing or shaking hands.  Don't let anyone visit.  Cover your mouth and nose with a mask, tissue or washcloth to avoid spreading germs.  Wash your hands and face often. Use soap and water.    What are the symptoms of COVID-19?  The most common symptoms are cough, fever and trouble breathing. Less common symptoms include headache, body aches, fatigue (feeling very tired), chills, sore throat, stuffy or runny nose,  diarrhea (loose poop), loss of taste or smell, belly pain, and nausea or vomiting (feeling sick to your stomach or throwing up).  After 14 days, if you have still don't have symptoms, you likely don't have this virus.  If you develop symptoms, follow these guidelines.  If you're normally healthy: Please start another eVisit.  If you have a serious health problem (like cancer, heart failure, an organ transplant or kidney disease): Call your specialty clinic. Let them know that you might have COVID-19.    Where can I get more information?  [facility] - About COVID-19: [email or web address]  CDC - What to Do If You're Sick: www.cdc.gov/coronavirus/2019-ncov/about/steps-when-sick.html  CDC - Ending Home Isolation: www.cdc.gov/coronavirus/2019-ncov/hcp/disposition-in-home-patients.html  CDC - Caring for Someone: www.cdc.gov/coronavirus/2019-ncov/if-you-are-sick/care-for-someone.html  [facility] clinical trials (COVID-19 research studies): clinicalaffairs.[facility]-clinical-trials  Below are the COVID-19 hotlines at the [place] Department of Health ([facility]). Interpreters are available.  For health questions: Call 248-427-8211 or 1-933.103.8157 (7 a.m. to 7 p.m.)  For questions about schools and childcare: Call 604-514-8004 or 1-291.334.8633 (7 a.m. to 7 p.m.)

## 2021-11-03 NOTE — TELEPHONE ENCOUNTER
Patient calling to schedule a covid test. Patient is going to be traveling and needs a covid test. Informed that she needs to have a visit with a provider so that they can order the test. Informed that an e-visit via [employer] is the easiest option. Informed that the test can take 72 hours to result so plan on that and give an extra day to make sure.   Patient verbalized understanding.     COVID-19 testing at [facility] is by appointment only. You'll need to schedule a time to get tested. If you have symptoms (signs) of COVID, please log in to [facility] to complete an e-visit (virtual visit). This is the first step to getting tested.    If you don't have COVID symptoms and want to get tested, you should also log in to [facility] for an e-visit. This includes people who:    have had close contact with a COVID-positive person    want to be tested before or after travel    have taken part in high-risk activities    have a school testing mandate, or     were told to get tested by their care team or the health department.     A [facility] e-visit is the fastest way for you to be seen by our care team. Please choose  Next available provider  to complete an e-visit. When you choose this option, the average response time is less than one hour.  After the e-visit, you'll be able to self-schedule your test at one of our testing locations. To learn more about our testing locations or for other details, please visit our COVID-19 Resource Hub.    Wingu is also the fastest way to get your test results. You'll get your results in [facility]ingu within 3 days. If you don't use Wingu, you'll get your results in the mail in 7 to 10 days. If your test is positive and you don't view your result in [facility] within 1 business day, you'll get a phone call with your result. A positive result means that you have COVID-19.    If you have an upcoming procedure at [facility], you'll need to be tested for COVID. The test needs to happen 2 to 4  days before your procedure. If you have an upcoming procedure, we will contact you to schedule a COVID test.    If you don't have a [employer] account, please call [phone] to set up a virtual visit. You can also find community testing sites in [place] at [place]/get-tested/testing-locations. If you live in [place], please visit [email or web address]/covid-19/community-testing.htm.    Neelam Triplett RN   11/03/21 6:26 PM  [facility] Nurse Advisor

## 2022-01-06 ENCOUNTER — IMMUNIZATION (OUTPATIENT)
Dept: NURSING | Facility: CLINIC | Age: 37
End: 2022-01-06
Payer: COMMERCIAL

## 2022-01-06 PROCEDURE — 91305 COVID-19,PF,PFIZER (12+ YRS): CPT

## 2022-01-06 PROCEDURE — 0054A COVID-19,PF,PFIZER (12+ YRS): CPT

## 2022-07-24 ENCOUNTER — HEALTH MAINTENANCE LETTER (OUTPATIENT)
Age: 37
End: 2022-07-24

## 2022-10-02 ENCOUNTER — HEALTH MAINTENANCE LETTER (OUTPATIENT)
Age: 37
End: 2022-10-02

## 2023-08-12 ENCOUNTER — HEALTH MAINTENANCE LETTER (OUTPATIENT)
Age: 38
End: 2023-08-12

## 2024-03-09 ENCOUNTER — HEALTH MAINTENANCE LETTER (OUTPATIENT)
Age: 39
End: 2024-03-09

## 2024-08-10 PROBLEM — K80.50 BILIARY COLIC SYMPTOM: Status: RESOLVED | Noted: 2020-10-01 | Resolved: 2024-08-10

## 2024-08-10 PROBLEM — K80.20 CALCULUS OF GALLBLADDER WITHOUT CHOLECYSTITIS WITHOUT OBSTRUCTION: Chronic | Status: RESOLVED | Noted: 2020-09-16 | Resolved: 2024-08-10

## 2024-08-12 SDOH — HEALTH STABILITY: PHYSICAL HEALTH: ON AVERAGE, HOW MANY MINUTES DO YOU ENGAGE IN EXERCISE AT THIS LEVEL?: 50 MIN

## 2024-08-12 SDOH — HEALTH STABILITY: PHYSICAL HEALTH: ON AVERAGE, HOW MANY DAYS PER WEEK DO YOU ENGAGE IN MODERATE TO STRENUOUS EXERCISE (LIKE A BRISK WALK)?: 5 DAYS

## 2024-08-12 ASSESSMENT — SOCIAL DETERMINANTS OF HEALTH (SDOH): HOW OFTEN DO YOU GET TOGETHER WITH FRIENDS OR RELATIVES?: ONCE A WEEK

## 2024-08-13 ENCOUNTER — OFFICE VISIT (OUTPATIENT)
Dept: FAMILY MEDICINE | Facility: CLINIC | Age: 39
End: 2024-08-13
Payer: COMMERCIAL

## 2024-08-13 VITALS
HEART RATE: 50 BPM | DIASTOLIC BLOOD PRESSURE: 70 MMHG | TEMPERATURE: 98.4 F | HEIGHT: 66 IN | SYSTOLIC BLOOD PRESSURE: 110 MMHG | OXYGEN SATURATION: 99 % | BODY MASS INDEX: 34.17 KG/M2 | WEIGHT: 212.6 LBS | RESPIRATION RATE: 18 BRPM

## 2024-08-13 DIAGNOSIS — Z12.31 ENCOUNTER FOR SCREENING MAMMOGRAM FOR MALIGNANT NEOPLASM OF BREAST: ICD-10-CM

## 2024-08-13 DIAGNOSIS — Z83.49 FAMILY HISTORY OF THYROID DISEASE: ICD-10-CM

## 2024-08-13 DIAGNOSIS — Z83.3 FAMILY HISTORY OF DIABETES MELLITUS: ICD-10-CM

## 2024-08-13 DIAGNOSIS — R00.1 SINUS BRADYCARDIA: ICD-10-CM

## 2024-08-13 DIAGNOSIS — Z00.00 ROUTINE PHYSICAL EXAMINATION: Primary | ICD-10-CM

## 2024-08-13 DIAGNOSIS — Z12.4 SCREENING FOR CERVICAL CANCER: ICD-10-CM

## 2024-08-13 DIAGNOSIS — E27.8 ADRENAL INCIDENTALOMA (H): ICD-10-CM

## 2024-08-13 LAB
CHOLEST SERPL-MCNC: 178 MG/DL
CORTIS SERPL-MCNC: 9.3 UG/DL
FASTING STATUS PATIENT QL REPORTED: YES
FASTING STATUS PATIENT QL REPORTED: YES
GLUCOSE SERPL-MCNC: 91 MG/DL (ref 70–99)
HBA1C MFR BLD: 5 % (ref 0–5.6)
HDLC SERPL-MCNC: 59 MG/DL
LDLC SERPL CALC-MCNC: 111 MG/DL
NONHDLC SERPL-MCNC: 119 MG/DL
TRIGL SERPL-MCNC: 40 MG/DL
TSH SERPL DL<=0.005 MIU/L-ACNC: 3.17 UIU/ML (ref 0.3–4.2)

## 2024-08-13 PROCEDURE — 87624 HPV HI-RISK TYP POOLED RSLT: CPT | Performed by: FAMILY MEDICINE

## 2024-08-13 PROCEDURE — 83036 HEMOGLOBIN GLYCOSYLATED A1C: CPT | Performed by: FAMILY MEDICINE

## 2024-08-13 PROCEDURE — 99386 PREV VISIT NEW AGE 40-64: CPT | Performed by: FAMILY MEDICINE

## 2024-08-13 PROCEDURE — 36415 COLL VENOUS BLD VENIPUNCTURE: CPT | Performed by: FAMILY MEDICINE

## 2024-08-13 PROCEDURE — 82947 ASSAY GLUCOSE BLOOD QUANT: CPT | Performed by: FAMILY MEDICINE

## 2024-08-13 PROCEDURE — 80061 LIPID PANEL: CPT | Performed by: FAMILY MEDICINE

## 2024-08-13 PROCEDURE — G0145 SCR C/V CYTO,THINLAYER,RESCR: HCPCS | Performed by: FAMILY MEDICINE

## 2024-08-13 PROCEDURE — 82533 TOTAL CORTISOL: CPT | Performed by: FAMILY MEDICINE

## 2024-08-13 PROCEDURE — 84443 ASSAY THYROID STIM HORMONE: CPT | Performed by: FAMILY MEDICINE

## 2024-08-13 PROCEDURE — 82627 DEHYDROEPIANDROSTERONE: CPT | Performed by: FAMILY MEDICINE

## 2024-08-13 RX ORDER — EVENING PRIMROSE OIL 500 MG
CAPSULE ORAL
COMMUNITY

## 2024-08-13 RX ORDER — SODIUM PHOSPHATE,MONO-DIBASIC 19G-7G/118
ENEMA (ML) RECTAL
COMMUNITY
Start: 2007-04-01

## 2024-08-13 ASSESSMENT — PAIN SCALES - GENERAL: PAINLEVEL: NO PAIN (0)

## 2024-08-13 NOTE — PATIENT INSTRUCTIONS
Patient Education   Preventive Care Advice   This is general advice given by our system to help you stay healthy. However, your care team may have specific advice just for you. Please talk to your care team about your preventive care needs.  Nutrition  Eat 5 or more servings of fruits and vegetables each day.  Try wheat bread, brown rice and whole grain pasta (instead of white bread, rice, and pasta).  Get enough calcium and vitamin D. Check the label on foods and aim for 100% of the RDA (recommended daily allowance).  Lifestyle  Exercise at least 150 minutes each week  (30 minutes a day, 5 days a week).  Do muscle strengthening activities 2 days a week. These help control your weight and prevent disease.  No smoking.  Wear sunscreen to prevent skin cancer.  Have a dental exam and cleaning every 6 months.  Yearly exams  See your health care team every year to talk about:  Any changes in your health.  Any medicines your care team has prescribed.  Preventive care, family planning, and ways to prevent chronic diseases.  Shots (vaccines)   HPV shots (up to age 26), if you've never had them before.  Hepatitis B shots (up to age 59), if you've never had them before.  COVID-19 shot: Get this shot when it's due.  Flu shot: Get a flu shot every year.  Tetanus shot: Get a tetanus shot every 10 years.  Pneumococcal, hepatitis A, and RSV shots: Ask your care team if you need these based on your risk.  Shingles shot (for age 50 and up)  General health tests  Diabetes screening:  Starting at age 35, Get screened for diabetes at least every 3 years.  If you are younger than age 35, ask your care team if you should be screened for diabetes.  Cholesterol test: At age 39, start having a cholesterol test every 5 years, or more often if advised.  Bone density scan (DEXA): At age 50, ask your care team if you should have this scan for osteoporosis (brittle bones).  Hepatitis C: Get tested at least once in your life.  STIs (sexually  transmitted infections)  Before age 24: Ask your care team if you should be screened for STIs.  After age 24: Get screened for STIs if you're at risk. You are at risk for STIs (including HIV) if:  You are sexually active with more than one person.  You don't use condoms every time.  You or a partner was diagnosed with a sexually transmitted infection.  If you are at risk for HIV, ask about PrEP medicine to prevent HIV.  Get tested for HIV at least once in your life, whether you are at risk for HIV or not.  Cancer screening tests  Cervical cancer screening: If you have a cervix, begin getting regular cervical cancer screening tests starting at age 21.  Breast cancer scan (mammogram): If you've ever had breasts, begin having regular mammograms starting at age 40. This is a scan to check for breast cancer.  Colon cancer screening: It is important to start screening for colon cancer at age 45.  Have a colonoscopy test every 10 years (or more often if you're at risk) Or, ask your provider about stool tests like a FIT test every year or Cologuard test every 3 years.  To learn more about your testing options, visit:   .  For help making a decision, visit:   https://bit.ly/ht37117.  Prostate cancer screening test: If you have a prostate, ask your care team if a prostate cancer screening test (PSA) at age 55 is right for you.  Lung cancer screening: If you are a current or former smoker ages 50 to 80, ask your care team if ongoing lung cancer screenings are right for you.  For informational purposes only. Not to replace the advice of your health care provider. Copyright   2023 [employer]. All rights reserved. Clinically reviewed by the [facility] Transitions Program. Breaker [id number] - REV 01/24.

## 2024-08-13 NOTE — PROGRESS NOTES
"Preventive Care Visit  [facility]  Jessica Bhagat MD, Family Medicine  Aug 13, 2024      Assessment & Plan     Routine physical examination  Encouraged healthy lifestyle habits including regular exercise, healthy eating habits, and adequate calcium and vitamin D intake.  Will screen for dyslipidemia and diabetes today.  Will await screening Pap smear.  Order placed for screening mammogram.  Immunizations reviewed and up-to-date.  She declines need for contraception or STI screening.  Information provided to her regarding urgency contraception.  - Lipid panel reflex to direct LDL Fasting; Future  - Glucose; Future    Sinus bradycardia  Chronic, asymptomatic, likely related to healthy lifestyle, no intervention necessary.    Adrenal incidentaloma (H24)  She has had a little bit of weight gain, a little bit of hair growth though no overt hirsutism on exam today.  Will check DHEA and cortisol levels as a result.  Will obtain follow-up CT scan.  Previous testing unremarkable.  - Hemoglobin A1c; Future  - DHEA sulfate; Future  - Cortisol; Future  - CT Abdomen Pelvis w/o & w Contrast; Future    Family history of diabetes mellitus  Screen for diabetes today.  - Glucose; Future  - Hemoglobin A1c; Future    Family history of thyroid disease  Screen for thyroid disease.  - TSH with free T4 reflex; Future    Screening for cervical cancer  Will await screening Pap smear.  - Gynecologic Cytology (Pap) and HPV - Recommended Age 30-65 Years    Encounter for screening mammogram for malignant neoplasm of breast  Placed for screening mammogram.  - MA Screen Bilateral w/Tarik; Future            BMI  Estimated body mass index is 34.31 kg/m  as calculated from the following:    Height as of this encounter: 1.676 m (5' 6\").    Weight as of this encounter: 96.4 kg (212 lb 9.6 oz).       Counseling  Appropriate preventive services were addressed with this patient via screening, questionnaire, or discussion as " appropriate for fall prevention, nutrition, physical activity, Tobacco-use cessation, weight loss and cognition.  Checklist reviewing preventive services available has been given to the patient.  Reviewed patient's diet, addressing concerns and/or questions.           Fernanda Colvin is a 39 year old, presenting for the following:  Physical (Fasting, pap)         Health Care Directive  Patient does not have a Health Care Directive or Living Will: Discussed advance care planning with patient; information given to patient to review.    Seen in clinic today for routine preventive care visit.  She herself has no concerns.  We note that in 2020 she had a CT scan that incidentally found a right adrenal adenoma, 1.8 mm and indeterminate in characteristics.  At that time her labs were unremarkable for functionality.  She notes her menses tend to be very regular although she missed her menses last month.  Has had a little bit of terminal hair growth on her chin and nipple area, no other concerning findings or changes in's.  She is chronic bradycardia which she attributes to healthy lifestyle habits that she does cardio most days of the week, asymptomatic from this.  Family history reviewed, notable for diabetes and thyroid disease.  She is not currently sexually active, declines need for pregnancy prevention or STI screening.            8/12/2024   General Health   How would you rate your overall physical health? Good   Feel stress (tense, anxious, or unable to sleep) To some extent      (!) STRESS CONCERN      8/12/2024   Nutrition   Three or more servings of calcium each day? Yes   Diet: Regular (no restrictions)    Low fat/cholesterol    Vegetarian/vegan   How many servings of fruit and vegetables per day? 4 or more   How many sweetened beverages each day? 0-1       Multiple values from one day are sorted in reverse-chronological order         8/12/2024   Exercise   Days per week of moderate/strenous exercise 5 days    Average minutes spent exercising at this level 50 min            8/12/2024   Social Factors   Frequency of gathering with friends or relatives Once a week   Worry food won't last until get money to buy more No   Food not last or not have enough money for food? No   Do you have housing? (Housing is defined as stable permanent housing and does not include staying ouside in a car, in a tent, in an abandoned building, in an overnight shelter, or couch-surfing.) Yes   Are you worried about losing your housing? No   Lack of transportation? No   Unable to get utilities (heat,electricity)? No            8/12/2024   Dental   Dentist two times every year? Yes            8/12/2024   TB Screening   Were you born outside of the [place]? No            Today's PHQ-2 Score:       8/12/2024     1:46 PM   PHQ-2 ( 1999 [employer])   Q1: Little interest or pleasure in doing things 1   Q2: Feeling down, depressed or hopeless 1   PHQ-2 Score 2   Q1: Little interest or pleasure in doing things Several days   Q2: Feeling down, depressed or hopeless Several days   PHQ-2 Score 2           8/12/2024   Substance Use   Alcohol more than 3/day or more than 7/wk No   Do you use any other substances recreationally? No        Social History     Tobacco Use    Smoking status: Never    Smokeless tobacco: Never   Vaping Use    Vaping status: Never Used   Substance Use Topics    Alcohol use: Never    Drug use: Never          Mammogram Screening - Patient under 40 years of age: Routine Mammogram Screening not recommended.           8/12/2024   One time HIV Screening   Previous HIV test? No          8/12/2024   STI Screening   New sexual partner(s) since last STI/HIV test? No        History of abnormal Pap smear: No - age 30-64 HPV with reflex Pap every 5 years recommended        10/29/2014     9:13 AM   PAP / HPV   PAP Negative for squamous intraepithelial lesion or malignancy  Electronically signed by Dilcia Stuart CT (ASCP) on 11/7/2014 at 12:07 PM         ASCVD Risk   The ASCVD Risk score (Rickey REYES, et al., 2019) failed to calculate for the following reasons:    Cannot find a previous HDL lab    Cannot find a previous total cholesterol lab        8/12/2024   Contraception/Family Planning   Questions about contraception or family planning No           Reviewed and updated as needed this visit by Provider                    No past medical history on file.  Past Surgical History:   Procedure Laterality Date    TONSILLECTOMY & ADENOIDECTOMY  2001    [facility] LAP,CHOLECYSTECTOMY/EXPLORE N/A 11/5/2020    Procedure: CHOLECYSTECTOMY, LAPAROSCOPIC;  Surgeon: Manan Crawford MD;  Location: [facility];  Service: General     OB History   No obstetric history on file.     Lab work is in process  Labs reviewed in EPIC  BP Readings from Last 3 Encounters:   08/13/24 110/70   11/12/20 100/70   09/30/20 120/68    Wt Readings from Last 3 Encounters:   08/13/24 96.4 kg (212 lb 9.6 oz)   11/12/20 94.5 kg (208 lb 4.8 oz)   11/05/20 90.7 kg (200 lb)                  Patient Active Problem List   Diagnosis    Class 1 obesity in adult    Sinus bradycardia    Adrenal incidentaloma (H24)     Past Surgical History:   Procedure Laterality Date    TONSILLECTOMY & ADENOIDECTOMY  2001    [facility] LAP,CHOLECYSTECTOMY/EXPLORE N/A 11/5/2020    Procedure: CHOLECYSTECTOMY, LAPAROSCOPIC;  Surgeon: Manan Crawford MD;  Location: [facility];  Service: General       Social History     Tobacco Use    Smoking status: Never    Smokeless tobacco: Never   Substance Use Topics    Alcohol use: Never     Family History   Problem Relation Age of Onset    No Known Problems Sister     No Known Problems Brother     Hypothyroidism Sister     No Known Problems Sister     Morbid Obesity Mother         had gastric bypass at 57    Diabetes Type 2  Father     Hypothyroidism Father     Hypertension Father     Pacemaker No family hx of          Current Outpatient Medications   Medication Sig Dispense  "Refill    cholecalciferol, vitamin D3, 50 mcg (2,000 unit) Tab [CHOLECALCIFEROL, VITAMIN D3, 50 MCG (2,000 UNIT) TAB] Take 1 tablet by mouth daily.      docosahexaenoic acid/epa (FISH OIL ORAL) [DOCOSAHEXAENOIC ACID/EPA (FISH OIL ORAL)] Take 1,200 mg by mouth daily.      Evening Primrose Oil 500 MG CAPS       glucosamine-chondroitin 500-400 MG CAPS per capsule       multivit-min/ferrous fumarate (MULTI VITAMIN ORAL) [MULTIVIT-MIN/FERROUS FUMARATE (MULTI VITAMIN ORAL)] Take 1 tablet by mouth daily.       Allergies   Allergen Reactions    Iodine Rash     Developed little red bumps after gallbladder surgery      Recent Labs   Lab Test 09/16/20  0304   ALT 32   CR 0.78   GFRESTIMATED >60   GFRESTBLACK >60   POTASSIUM 4.4   TSH 1.74          Review of Systems  Constitutional, neuro, ENT, endocrine, pulmonary, cardiac, gastrointestinal, genitourinary, musculoskeletal, integument and psychiatric systems are negative, except as otherwise noted.     Objective    Exam  /70   Pulse 50   Temp 98.4  F (36.9  C) (Temporal)   Resp 18   Ht 1.676 m (5' 6\")   Wt 96.4 kg (212 lb 9.6 oz)   LMP 06/12/2024 (Approximate)   SpO2 99%   BMI 34.31 kg/m     Estimated body mass index is 34.31 kg/m  as calculated from the following:    Height as of this encounter: 1.676 m (5' 6\").    Weight as of this encounter: 96.4 kg (212 lb 9.6 oz).    Physical Exam  Physical Examination: General appearance - alert, well appearing, and in no distress, oriented to person, place, and time and normal appearing weight  Mental status - alert, oriented to person, place, and time, normal mood, behavior, speech, dress, motor activity, and thought processes  Eyes - pupils equal and reactive, extraocular eye movements intact  Ears - bilateral TM's and external ear canals normal  Nose - normal and patent, no erythema, discharge or polyps  Mouth - mucous membranes moist, pharynx normal without lesions  Neck - supple, no significant adenopathy  Lymphatics - " no palpable lymphadenopathy, no hepatosplenomegaly  Chest - clear to auscultation, no wheezes, rales or rhonchi, symmetric air entry  Heart - normal rate, regular rhythm, normal S1, S2, no murmurs, rubs, clicks or gallops  Abdomen - soft, nontender, nondistended, no masses or organomegaly  Breasts - breasts appear normal, no suspicious masses, no skin or nipple changes or axillary nodes  Neurological - alert, oriented, normal speech, no focal findings or movement disorder noted  Musculoskeletal - no joint tenderness, deformity or swelling  Extremities - peripheral pulses normal, no pedal edema, no clubbing or cyanosis  Skin - normal coloration and turgor, no rashes, no suspicious skin lesions noted   Pelvic - Normal external female genitalia.  Vaginal and cervical mucosa within normal limits on speculum exam.  Surepap obtained.         Signed Electronically by: Jessica Bhagat MD

## 2024-08-14 LAB — DHEA-S SERPL-MCNC: 206 UG/DL (ref 35–430)

## 2024-08-15 LAB
HPV HR 12 DNA CVX QL NAA+PROBE: NEGATIVE
HPV16 DNA CVX QL NAA+PROBE: NEGATIVE
HPV18 DNA CVX QL NAA+PROBE: NEGATIVE
HUMAN PAPILLOMA VIRUS FINAL DIAGNOSIS: NORMAL

## 2024-08-16 LAB
BKR LAB AP GYN ADEQUACY: NORMAL
BKR LAB AP GYN INTERPRETATION: NORMAL
BKR LAB AP LMP: NORMAL
BKR LAB AP PREVIOUS ABNORMAL: NORMAL
PATH REPORT.COMMENTS IMP SPEC: NORMAL
PATH REPORT.COMMENTS IMP SPEC: NORMAL
PATH REPORT.RELEVANT HX SPEC: NORMAL

## 2024-12-17 ENCOUNTER — PATIENT OUTREACH (OUTPATIENT)
Dept: CARE COORDINATION | Facility: CLINIC | Age: 39
End: 2024-12-17
Payer: COMMERCIAL

## 2025-07-21 ENCOUNTER — PATIENT OUTREACH (OUTPATIENT)
Dept: CARE COORDINATION | Facility: CLINIC | Age: 40
End: 2025-07-21
Payer: COMMERCIAL

## 2025-08-04 ENCOUNTER — PATIENT OUTREACH (OUTPATIENT)
Dept: CARE COORDINATION | Facility: CLINIC | Age: 40
End: 2025-08-04
Payer: COMMERCIAL